# Patient Record
Sex: MALE
[De-identification: names, ages, dates, MRNs, and addresses within clinical notes are randomized per-mention and may not be internally consistent; named-entity substitution may affect disease eponyms.]

---

## 2019-08-20 PROBLEM — Z00.00 ENCOUNTER FOR PREVENTIVE HEALTH EXAMINATION: Status: ACTIVE | Noted: 2019-08-20

## 2019-08-21 ENCOUNTER — APPOINTMENT (OUTPATIENT)
Dept: PHYSICAL MEDICINE AND REHAB | Facility: CLINIC | Age: 29
End: 2019-08-21
Payer: COMMERCIAL

## 2019-08-21 VITALS
SYSTOLIC BLOOD PRESSURE: 102 MMHG | WEIGHT: 167 LBS | HEIGHT: 70.08 IN | HEART RATE: 66 BPM | BODY MASS INDEX: 23.91 KG/M2 | DIASTOLIC BLOOD PRESSURE: 65 MMHG

## 2019-08-21 DIAGNOSIS — G89.29 PAIN IN RIGHT SHOULDER: ICD-10-CM

## 2019-08-21 DIAGNOSIS — M25.511 PAIN IN RIGHT SHOULDER: ICD-10-CM

## 2019-08-21 PROCEDURE — 99204 OFFICE O/P NEW MOD 45 MIN: CPT

## 2019-08-21 NOTE — PHYSICAL EXAM
[FreeTextEntry1] : PHYSICAL EXAM : OBJECTIVE \par \par GENERAL : Awake ,alert and oriented to time place and person \par HEAD : normocephalic and atraumatic \par NECK : supple ,no tracheal deviation ,no thyroid enlargement noted with swallowing\par EYES : sclera and conjunctiva normal no redness,intact extraocular movements \par ENT  : ears and nose normal in appearance -hearing adequate \par PULMONARY: effort normal. No respiratory distress. breathing regular. No wheezes \par LYMPH : No swelling in limbs, capillary return within normal range \par CVS : warm extremities,no palpitations,not short of breath, no visible jugular venous distention\par PSYCH : mood and affect normal ,good eye contact ,normal attention \par ABDOMEN : no visible distension , \par NEUROLOGICAL:cranial nerves intact,muscle tone normal,gait and balance safe except where noted below \par SKIN : warm and dry No rash detected over specific body areas examined \par MUSCULOSKELETAL: normal muscle bulk, no focal bony tenderness /posture normal except where specified below\par \par  SHOULDER EXAMINATION ON SYMPTOMATIC SIDE -R \par \par TENDERNESS TO PALPATION  BICEPS INSERTION +++\par IMPINGEMENT yes \par DROP ARM TEST no \par MUSCLE BULK WNR-well developed \par \par ROM EXAM ABDUCTION  160   ( 0 -180)\par                     FORWARD FLEXION 160  (0-180)\par                     INTERNAL ROTATION SPINE LEVEL  ++ PAIN \par                     EXTERNAL ROTATION  no PAIN \par                     CROSS ADDUCTION no PAIN \par \par NECK EXAM NORMAL \par SKIN INTACT NO ERYTHEMA \par NVI \par NO WINGING SCAPULA \par MMT INTACT LIMITED BY PAIN \par SENSATION INTACT \par INSTABILITY NO AND NEG APPREHENSION ON PROM\par

## 2019-08-21 NOTE — REVIEW OF SYSTEMS
[Joint Pain] : joint pain [Muscle Pain] : muscle pain [Joint Stiffness] : joint stiffness [Negative] : Heme/Lymph [Muscle Weakness] : no muscle weakness [FreeTextEntry9] : r shoulder

## 2019-08-21 NOTE — ASSESSMENT
[FreeTextEntry1] : \par PLAN AND RECOMMENDATIONS :\par \par We discussed differential diagnosis and clinical impression\par he has a bicipital tendonitis and perhaps partial rotator cuff tear as well -he does not have MRI report or CD \[facility] \[facility]  \par Recommend\par .symptomatic care and support\par  medications OTC \par  \par  referral to PT \par  hydrotherapy /heat / cold for pain\par  continue ergonomic guidelines \par  relative rest and avoidance of painful activity where possible \par  increasing activity as discussed \par given hand out on condition \par  return for follow up 6-8 weeks \par

## 2019-08-21 NOTE — HISTORY OF PRESENT ILLNESS
[FreeTextEntry1] : Mr. ELYSIA MIGUEL is a very pleasant 28 year male who comes in for evaluation of R shoulder  that has been ongoing for 10 months  without any specific injury or inciting event although he thinks it was from swimming \par The pain is located primarily ant shoulder  intermittent in nature and described as sharp and shooting  . The pain is rated as 1/10 during today's visit, and ranges from 1-4/10. The patient's symptoms are aggravated by lifting weights  and alleviated by resting . The patient denies any night pain, numbness/tingling, weakness, or bowel/bladder dysfunction. The patient has no other complaints at this time.\par he had an MRI and saw Dr Mauri Buckner who informed him he had a bicipital tendonitis on mRI \par He had a cortisone injection which helped  for a while at the practice \par - was advised PT but never made appt \par \par he works as a  and is R handed

## 2019-12-31 ENCOUNTER — APPOINTMENT (OUTPATIENT)
Dept: PHYSICAL MEDICINE AND REHAB | Facility: CLINIC | Age: 29
End: 2019-12-31

## 2020-01-08 ENCOUNTER — APPOINTMENT (OUTPATIENT)
Dept: PHYSICAL MEDICINE AND REHAB | Facility: CLINIC | Age: 30
End: 2020-01-08
Payer: COMMERCIAL

## 2020-01-08 VITALS
HEART RATE: 64 BPM | DIASTOLIC BLOOD PRESSURE: 72 MMHG | BODY MASS INDEX: 23.91 KG/M2 | HEIGHT: 70 IN | SYSTOLIC BLOOD PRESSURE: 127 MMHG | WEIGHT: 167 LBS

## 2020-01-08 DIAGNOSIS — Z78.9 OTHER SPECIFIED HEALTH STATUS: ICD-10-CM

## 2020-01-08 DIAGNOSIS — M75.41 IMPINGEMENT SYNDROME OF RIGHT SHOULDER: ICD-10-CM

## 2020-01-08 DIAGNOSIS — M54.12 RADICULOPATHY, CERVICAL REGION: ICD-10-CM

## 2020-01-08 DIAGNOSIS — M75.21 BICIPITAL TENDINITIS, RIGHT SHOULDER: ICD-10-CM

## 2020-01-08 PROCEDURE — 99214 OFFICE O/P EST MOD 30 MIN: CPT

## 2020-01-08 NOTE — REVIEW OF SYSTEMS
[Joint Pain] : joint pain [Negative] : Psychiatric [Fever] : no fever [Chills] : no chills [Fatigue] : no fatigue [Recent Change In Weight] : ~T no recent weight change [Joint Stiffness] : no joint stiffness [Muscle Pain] : no muscle pain [Muscle Weakness] : no muscle weakness [Difficulty Walking] : no difficulty walking [FreeTextEntry9] : shoulder r  [de-identified] : no numbness

## 2020-01-08 NOTE — HISTORY OF PRESENT ILLNESS
[FreeTextEntry1] : Mr. ELYSIA MIGUEL is a very pleasant 29 year male who comes in for  R shoulder to arm pain   that has restarted  2   months ago  without any specific injury or inciting event \par he had same last august and did well with PT 3-4 sessions \par now relapsed tending to radiate down medial forearm too \par \par The pain is located primarily ant shoulder  intermittent in nature and described as sharp and shooting  . The pain is rated as 3/10 during today's visit, and ranges from 1-7/10. The patient's symptoms are aggravated by lifting weights/movement   and alleviated by resting . The patient denies any night pain, numbness/tingling, weakness, or bowel/bladder dysfunction. The patient has no other complaints at this time.\par he is R handed \par he had an MRI and saw Dr Mauri Buckner who informed him he had a bicipital tendonitis on mRI \par He had a cortisone injection which helped  for a while at the practice \par - was advised PT but never made appt \par \par he works as a  and is R handed

## 2020-01-08 NOTE — ASSESSMENT
[FreeTextEntry1] : \par PLAN AND RECOMMENDATIONS :\par \par We discussed differential diagnosis and clinical impression\par he has exacerbation of previous condition but worst now \par Recommend\par .symptomatic care and support\par  medications OTC \par  imaging not needed \par  referral to PT \par  hydrotherapy /heat / cold for pain\par  continue ergonomic precautions \par  relative rest and avoidance of painful activity where possible \par  increasing activity as discussed \par  return for follow up\par